# Patient Record
Sex: FEMALE | Race: ASIAN | URBAN - METROPOLITAN AREA
[De-identification: names, ages, dates, MRNs, and addresses within clinical notes are randomized per-mention and may not be internally consistent; named-entity substitution may affect disease eponyms.]

---

## 2017-06-05 ENCOUNTER — IMPORTED ENCOUNTER (OUTPATIENT)
Dept: URBAN - METROPOLITAN AREA CLINIC 50 | Facility: CLINIC | Age: 72
End: 2017-06-05

## 2017-06-14 NOTE — PATIENT DISCUSSION
Continue: PreserVision AREDS 2 (vit c,a-kv-byqmf-lutein-zeaxan): capsule: 357-223-60-1 mg-unit-mg-mg

## 2017-06-14 NOTE — PATIENT DISCUSSION
S/P RD REPAIR APPEARS STABLE: NO HOLES OR NO TEARS 360' WITH INDIRECT EXAM, OU. F&amp;F PRECAUTIONS OF F&amp;F REVIEWED WITH THE PATIENT. RETURN  AS SCHEDULED.

## 2017-06-14 NOTE — PATIENT DISCUSSION
S/P Retinal Repair Counseling: I have discussed the diagnosis of RD repair with the patient and the possibility of a another retinal tear or detachment. The signs/symptoms of a retinal tear/detachment were reviewed to include but not limited to redness, discharge, pain, increase or change in flashes of light, increase or change in floaters, decreased vision, part of the vision missing, veils or any other ocular concerns. I have further explained not all patients who develop a tear or detachment have notable symptoms, therefore, compliance with return visits are necessary. The patient was instructed to contact us immediately if they noticed any of the signs or symptoms of retinal detachment as noted above as the prognosis for any potential treatment options may be time related. Return for follow-up as scheduled.

## 2017-06-28 ENCOUNTER — IMPORTED ENCOUNTER (OUTPATIENT)
Dept: URBAN - METROPOLITAN AREA CLINIC 50 | Facility: CLINIC | Age: 72
End: 2017-06-28

## 2017-12-27 ENCOUNTER — IMPORTED ENCOUNTER (OUTPATIENT)
Dept: URBAN - METROPOLITAN AREA CLINIC 50 | Facility: CLINIC | Age: 72
End: 2017-12-27

## 2018-01-09 NOTE — PATIENT DISCUSSION
RETINAL PIGMENT EPITHELIAL DETACHMENT OD: NO EVIDENCE OF EXUDATION/HEMORRHAGE ON EXAM. RECOMMEND CLOSE OBSERVATION. RETURN AS SCHEDULED.

## 2018-01-09 NOTE — PATIENT DISCUSSION
RETINA IS ATTACHED OU: PVD OU; SCLERAL BUCKLE OD; NO HOLES OR TEARS SEEN ON DILATED EXAM TODAY.  RETINAL DETACHMENT SIGNS AND SYMPTOMS REVIEWED

## 2018-01-09 NOTE — PATIENT DISCUSSION
Continue: PreserVision AREDS 2 (vit c,e-sd-kecco-lutein-zeaxan): capsule: 735-432-24-7 mg-unit-mg-mg

## 2018-02-07 NOTE — PATIENT DISCUSSION
Continue: PreserVision AREDS 2 (vit c,g-lr-bglvb-lutein-zeaxan): capsule: 422-299-00-2 mg-unit-mg-mg

## 2018-02-07 NOTE — PATIENT DISCUSSION
RETINA IS ATTACHED OU: PVD OD; SCLERAL BUCKLE OD; NO HOLES OR TEARS SEEN ON DILATED EXAM TODAY.  RETINAL DETACHMENT SIGNS AND SYMPTOMS REVIEWED

## 2018-02-07 NOTE — PATIENT DISCUSSION
HTN RETINOPATHY: RETINAL COTTON WOOL SPOT OD, RESOLVING. EMPHASIZED IMPORTANCE OF GOOD BLOOD PRESSURE CONTROL TO MINIMIZE RISK OF VASCULAR COMPLICATIONS IN THE EYE.

## 2018-06-27 ENCOUNTER — IMPORTED ENCOUNTER (OUTPATIENT)
Dept: URBAN - METROPOLITAN AREA CLINIC 50 | Facility: CLINIC | Age: 73
End: 2018-06-27

## 2018-07-02 NOTE — PATIENT DISCUSSION
Continue: PreserVision AREDS 2 (vit c,z-ip-yefjj-lutein-zeaxan): capsule: 003-219-33-7 mg-unit-mg-mg 1 capsule once a day by mouth

## 2018-07-02 NOTE — PATIENT DISCUSSION
Continue: Refresh Tears (carboxymethylcellulose sodium): drops: 0.5% 1 drop as directed into both eyes

## 2018-08-08 NOTE — PATIENT DISCUSSION
HTN RETINOPATHY: IMPROVING; COTTON WOOL SPOTS HAVE RESOLVED; EMPHASIZED IMPORTANCE OF GOOD BLOOD PRESSURE CONTROL TO MINIMIZE RISK OF VASCULAR COMPLICATIONS IN THE EYE.

## 2018-08-08 NOTE — PATIENT DISCUSSION
Continue: PreserVision AREDS 2 (vit c,w-jp-pjpbx-lutein-zeaxan): capsule: 320-650-49-6 mg-unit-mg-mg 1 capsule once a day by mouth

## 2019-02-06 ENCOUNTER — IMPORTED ENCOUNTER (OUTPATIENT)
Dept: URBAN - METROPOLITAN AREA CLINIC 50 | Facility: CLINIC | Age: 74
End: 2019-02-06

## 2019-02-14 ENCOUNTER — IMPORTED ENCOUNTER (OUTPATIENT)
Dept: URBAN - METROPOLITAN AREA CLINIC 50 | Facility: CLINIC | Age: 74
End: 2019-02-14

## 2019-03-26 ENCOUNTER — IMPORTED ENCOUNTER (OUTPATIENT)
Dept: URBAN - METROPOLITAN AREA CLINIC 50 | Facility: CLINIC | Age: 74
End: 2019-03-26

## 2019-04-01 ENCOUNTER — IMPORTED ENCOUNTER (OUTPATIENT)
Dept: URBAN - METROPOLITAN AREA CLINIC 50 | Facility: CLINIC | Age: 74
End: 2019-04-01

## 2019-04-10 ENCOUNTER — IMPORTED ENCOUNTER (OUTPATIENT)
Dept: URBAN - METROPOLITAN AREA CLINIC 50 | Facility: CLINIC | Age: 74
End: 2019-04-10

## 2019-04-10 NOTE — PATIENT DISCUSSION
"""S/P IOL OD: Tecnis  ZKB00 19.5 (ORA) +ORA/Femto +Omidria. Continue post operative instructions and drops per schedule.  """

## 2019-04-19 ENCOUNTER — IMPORTED ENCOUNTER (OUTPATIENT)
Dept: URBAN - METROPOLITAN AREA CLINIC 50 | Facility: CLINIC | Age: 74
End: 2019-04-19

## 2019-04-24 ENCOUNTER — IMPORTED ENCOUNTER (OUTPATIENT)
Dept: URBAN - METROPOLITAN AREA CLINIC 50 | Facility: CLINIC | Age: 74
End: 2019-04-24

## 2019-04-24 PROBLEM — Z96.1: Noted: 2019-04-10

## 2019-04-24 PROBLEM — Z96.1: Noted: 2019-04-24

## 2019-04-24 NOTE — PATIENT DISCUSSION
"""S/P IOL OS: Tecnis  ZKB00 19.5 +Femto/Arcs +Omidria. Continue post operative instructions and drops per schedule.  """

## 2019-04-29 ENCOUNTER — IMPORTED ENCOUNTER (OUTPATIENT)
Dept: URBAN - METROPOLITAN AREA CLINIC 50 | Facility: CLINIC | Age: 74
End: 2019-04-29

## 2019-05-31 ENCOUNTER — IMPORTED ENCOUNTER (OUTPATIENT)
Dept: URBAN - METROPOLITAN AREA CLINIC 50 | Facility: CLINIC | Age: 74
End: 2019-05-31

## 2019-08-05 ENCOUNTER — IMPORTED ENCOUNTER (OUTPATIENT)
Dept: URBAN - METROPOLITAN AREA CLINIC 50 | Facility: CLINIC | Age: 74
End: 2019-08-05

## 2019-08-06 ENCOUNTER — IMPORTED ENCOUNTER (OUTPATIENT)
Dept: URBAN - METROPOLITAN AREA CLINIC 50 | Facility: CLINIC | Age: 74
End: 2019-08-06

## 2019-08-06 NOTE — PATIENT DISCUSSION
"""Start Cool compresses both eyes . "" ""Start lotemax SM both eyes four times a day ."" ""Continue Artificial tears both eyes two - four times a day . """

## 2019-08-12 ENCOUNTER — IMPORTED ENCOUNTER (OUTPATIENT)
Dept: URBAN - METROPOLITAN AREA CLINIC 50 | Facility: CLINIC | Age: 74
End: 2019-08-12

## 2019-08-29 ENCOUNTER — IMPORTED ENCOUNTER (OUTPATIENT)
Dept: URBAN - METROPOLITAN AREA CLINIC 50 | Facility: CLINIC | Age: 74
End: 2019-08-29

## 2019-09-12 ENCOUNTER — IMPORTED ENCOUNTER (OUTPATIENT)
Dept: URBAN - METROPOLITAN AREA CLINIC 50 | Facility: CLINIC | Age: 74
End: 2019-09-12

## 2020-05-18 NOTE — PATIENT DISCUSSION
Continue: PreserVision AREDS 2 (vit c,c-ae-bmdxt-lutein-zeaxan): capsule: 460-374-10-3 mg-unit-mg-mg 1 capsule once a day by mouth

## 2020-09-17 ENCOUNTER — IMPORTED ENCOUNTER (OUTPATIENT)
Dept: URBAN - METROPOLITAN AREA CLINIC 50 | Facility: CLINIC | Age: 75
End: 2020-09-17

## 2020-11-18 NOTE — PATIENT DISCUSSION
COUNSELING:
Continue: PreserVision AREDS 2 (vit c,r-zx-dqamh-lutein-zeaxan): capsule: 880-879-01-5 mg-unit-mg-mg 1 capsule once a day by mouth
Continue: Refresh Tears (carboxymethylcellulose sodium): drops: 0.5% 1 drop as directed into both eyes
General:
HTN RETINOPATHY: IMPROVING. EMPHASIZED IMPORTANCE OF GOOD BLOOD PRESSURE CONTROL TO MINIMIZE RISK OF VASCULAR COMPLICATIONS IN THE EYE.
Hypertensive Retinopathy Counseling: The pathophysiology of hypertensive retinopathy and associated comorbidity was discussed with the patient. The importance of compliance with medications, and good blood pressure control was emphasized to limit risk of retinal ischemia and loss of vision. The patient should return for follow up immediately if any change of vision.
Medications:
Posterior Vitreous Detachment: I have discussed the diagnosis of PVD with the patient and the possibility of a retinal tear or detachment. The signs/symptoms of a retinal tear/detachment were reviewed to include but not limited to redness, discharge, pain, increase or change in flashes of light, increase or change in floaters, decreased vision, part of the vision missing, veils or any other ocular concerns. I have further explained not all patients who develop a tear or detachment have notable symptoms, therefore, compliance with return visits are necessary. The patient was instructed to contact us immediately if they noticed any of the signs or symptoms of retinal detachment as noted above as the prognosis for any potential treatment options may be time related. Return for follow-up as scheduled.
RETINA IS ATTACHED OU: PVD OD; SCLERAL BUCKLE OD; NO HOLES OR TEARS SEEN ON DILATED EXAM TODAY.  RETINAL DETACHMENT SIGNS AND SYMPTOMS REVIEWED
REVIEWED PVD PRECAUTIONS WITH PATIENT AND ADVISED TO CALL IF EXPERIENCES NEW FLASHES OF LIGHT, INCREASE IN FLOATERS, OR DECREASE VISION IN EITHER EYE.
Patient/Caregiver provided printed discharge information.

## 2021-04-17 ASSESSMENT — VISUAL ACUITY
OS_BAT: 20/30
OS_OTHER: 20/40. 20/70.
OS_CC: 20/30
OS_BAT: 20/40
OD_CC: J3
OD_BAT: 20/400
OS_CC: 20/25+
OD_SC: 20/25-
OS_CC: J1+ (-)
OD_CC: 20/25
OS_SC: 20/40
OD_OTHER: 20/400.
OD_SC: 20/20
OD_SC: 20/20
OS_CC: J1+(-2)
OD_OTHER: 20/50. 20/70.
OS_SC: 20/20
OS_SC: 20/25+
OD_CC: J1+ (-)
OS_SC: 20/25-2
OS_BAT: 20/40
OD_OTHER: 20/30. 20/30.
OD_CC: J3
OD_CC: J1
OD_BAT: 20/50
OD_SC: 20/20-1
OS_SC: 20/25
OD_BAT: 20/400
OD_OTHER: 20/25. 20/70.
OS_OTHER: 20/30. 20/40.
OS_OTHER: 20/40. 20/50.
OS_BAT: 20/200
OD_SC: 20/25+1
OD_SC: 20/20-2
OD_SC: 20/25-2
OD_SC: 20/25-2
OS_SC: 20/25
OS_SC: 20/25
OS_CC: J1+-@ 18 IN
OD_SC: 20/25
OS_CC: J3
OD_CC: J1+(-2)
OD_PH: @ 17 IN
OS_SC: 20/25+
OS_SC: 20/20-3
OS_BAT: 20/200
OS_SC: 20/20-1
OD_SC: 20/30
OS_CC: 20/25
OS_SC: 20/25
OS_OTHER: 20/200.
OS_PH: 20/25
OS_OTHER: 20/200. 20/200.
OS_SC: 20/20
OS_CC: J3
OD_SC: 20/30-2
OD_OTHER: 20/400. 20/70.
OD_BAT: 20/30
OS_CC: J1
OD_BAT: 20/25
OD_SC: 20/20-2
OD_SC: 20/20-1
OD_CC: J1+-@ 18 IN

## 2021-04-17 ASSESSMENT — PACHYMETRY
OD_CT_UM: 566
OS_CT_UM: 565
OD_CT_UM: 566
OD_CT_UM: 566
OS_CT_UM: 565
OD_CT_UM: 566
OS_CT_UM: 565
OD_CT_UM: 566
OS_CT_UM: 565
OS_CT_UM: 565
OD_CT_UM: 566
OS_CT_UM: 565
OD_CT_UM: 566
OS_CT_UM: 565
OD_CT_UM: 566
OS_CT_UM: 565
OD_CT_UM: 566
OS_CT_UM: 565
OD_CT_UM: 566
OS_CT_UM: 565
OD_CT_UM: 566
OD_CT_UM: 566
OS_CT_UM: 565
OD_CT_UM: 566
OD_CT_UM: 566
OS_CT_UM: 565
OD_CT_UM: 566
OS_CT_UM: 565
OS_CT_UM: 565

## 2021-04-17 ASSESSMENT — TONOMETRY
OS_IOP_MMHG: 18
OD_IOP_MMHG: 18
OD_IOP_MMHG: 14
OS_IOP_MMHG: 13
OS_IOP_MMHG: 20
OD_IOP_MMHG: 12
OD_IOP_MMHG: 18
OS_IOP_MMHG: 27
OD_IOP_MMHG: 174
OD_IOP_MMHG: 16
OS_IOP_MMHG: 12
OS_IOP_MMHG: 14
OD_IOP_MMHG: 16
OD_IOP_MMHG: 17
OS_IOP_MMHG: 16
OS_IOP_MMHG: 13
OD_IOP_MMHG: 16
OS_IOP_MMHG: 17
OS_IOP_MMHG: 19
OS_IOP_MMHG: 20
OS_IOP_MMHG: 17
OS_IOP_MMHG: 16
OS_IOP_MMHG: 16
OD_IOP_MMHG: 18
OS_IOP_MMHG: 17
OD_IOP_MMHG: 15
OD_IOP_MMHG: 16
OD_IOP_MMHG: 19
OS_IOP_MMHG: 18
OS_IOP_MMHG: 15
OD_IOP_MMHG: 19
OS_IOP_MMHG: 15
OS_IOP_MMHG: 18
OD_IOP_MMHG: 13
OD_IOP_MMHG: 19
OD_IOP_MMHG: 17
OD_IOP_MMHG: 13
OS_IOP_MMHG: 14
OD_IOP_MMHG: 15
OD_IOP_MMHG: 15
OD_IOP_MMHG: 17
OS_IOP_MMHG: 19
OD_IOP_MMHG: 173
OS_IOP_MMHG: 16
OD_IOP_MMHG: 31
OD_IOP_MMHG: 14
OS_IOP_MMHG: 14
OS_IOP_MMHG: 15
OD_IOP_MMHG: 14
OS_IOP_MMHG: 28
OS_IOP_MMHG: 19
OD_IOP_MMHG: 30
OS_IOP_MMHG: 13
OD_IOP_MMHG: 13
OD_IOP_MMHG: 20
OS_IOP_MMHG: 17

## 2021-09-08 ENCOUNTER — PREPPED CHART (OUTPATIENT)
Dept: URBAN - METROPOLITAN AREA CLINIC 53 | Facility: CLINIC | Age: 76
End: 2021-09-08

## 2021-09-08 NOTE — PATIENT DISCUSSION
Continue: PreserVision AREDS 2 (vit c,m-rq-byqht-lutein-zeaxan): capsule: 664-400-47-1 mg-unit-mg-mg 1 capsule once a day by mouth

## 2021-09-20 ENCOUNTER — COMPREHENSIVE EXAM (OUTPATIENT)
Dept: URBAN - METROPOLITAN AREA CLINIC 53 | Facility: CLINIC | Age: 76
End: 2021-09-20

## 2021-09-20 DIAGNOSIS — E11.9: ICD-10-CM

## 2021-09-20 DIAGNOSIS — H35.3131: ICD-10-CM

## 2021-09-20 DIAGNOSIS — H40.013: ICD-10-CM

## 2021-09-20 DIAGNOSIS — H35.373: ICD-10-CM

## 2021-09-20 DIAGNOSIS — H26.493: ICD-10-CM

## 2021-09-20 DIAGNOSIS — H16.223: ICD-10-CM

## 2021-09-20 PROCEDURE — 92134 CPTRZ OPH DX IMG PST SGM RTA: CPT

## 2021-09-20 PROCEDURE — 92014 COMPRE OPH EXAM EST PT 1/>: CPT

## 2021-09-20 ASSESSMENT — VISUAL ACUITY
OU_SC: 20/20
OD_SC: 20/25
OS_GLARE: 20/30
OD_GLARE: 20/25
OD_GLARE: 20/30
OS_GLARE: 20/25
OS_SC: 20/25
OU_SC: J1

## 2021-09-20 ASSESSMENT — KERATOMETRY
OS_K2POWER_DIOPTERS: 42.25
OS_AXISANGLE_DEGREES: 58
OD_AXISANGLE2_DEGREES: 71
OD_K1POWER_DIOPTERS: 41.00
OS_K1POWER_DIOPTERS: 41.75
OS_AXISANGLE2_DEGREES: 148
OD_AXISANGLE_DEGREES: 161
OD_K2POWER_DIOPTERS: 41.75

## 2021-09-20 ASSESSMENT — TONOMETRY
OS_IOP_MMHG: 13
OD_IOP_MMHG: 13
OD_IOP_MMHG: 12
OS_IOP_MMHG: 12

## 2021-09-21 ENCOUNTER — DIAGNOSTICS - HVF/OCT (OUTPATIENT)
Dept: URBAN - METROPOLITAN AREA CLINIC 53 | Facility: CLINIC | Age: 76
End: 2021-09-21

## 2021-09-21 DIAGNOSIS — H40.013: ICD-10-CM

## 2021-09-21 PROCEDURE — 92133 CPTRZD OPH DX IMG PST SGM ON: CPT

## 2021-09-21 PROCEDURE — 92083 EXTENDED VISUAL FIELD XM: CPT

## 2021-09-21 ASSESSMENT — KERATOMETRY
OD_K1POWER_DIOPTERS: 41.00
OS_K2POWER_DIOPTERS: 42.25
OD_AXISANGLE2_DEGREES: 71
OD_AXISANGLE_DEGREES: 161
OS_AXISANGLE_DEGREES: 58
OS_AXISANGLE2_DEGREES: 148
OD_K2POWER_DIOPTERS: 41.75
OS_K1POWER_DIOPTERS: 41.75

## 2022-09-26 ENCOUNTER — COMPREHENSIVE EXAM (OUTPATIENT)
Dept: URBAN - METROPOLITAN AREA CLINIC 53 | Facility: CLINIC | Age: 77
End: 2022-09-26

## 2022-09-26 DIAGNOSIS — H26.493: ICD-10-CM

## 2022-09-26 DIAGNOSIS — H35.3131: ICD-10-CM

## 2022-09-26 DIAGNOSIS — E11.9: ICD-10-CM

## 2022-09-26 DIAGNOSIS — H16.223: ICD-10-CM

## 2022-09-26 PROCEDURE — 92014 COMPRE OPH EXAM EST PT 1/>: CPT

## 2022-09-26 PROCEDURE — 66821 AFTER CATARACT LASER SURGERY: CPT

## 2022-09-26 PROCEDURE — 92134 CPTRZ OPH DX IMG PST SGM RTA: CPT

## 2022-09-26 RX ORDER — PREDNISOLONE ACETATE 10 MG/ML: 1 SUSPENSION/ DROPS OPHTHALMIC TWICE A DAY

## 2022-09-26 ASSESSMENT — VISUAL ACUITY
OS_GLARE: 20/40
OD_GLARE: 20/80
OD_SC: 20/70
OD_PH: 20/30
OS_GLARE: 20/30
OD_GLARE: 20/100
OS_SC: 20/25
OU_SC: J2 @ 16 IN

## 2022-09-26 ASSESSMENT — KERATOMETRY
OS_K1POWER_DIOPTERS: 41.75
OS_AXISANGLE2_DEGREES: 148
OS_K2POWER_DIOPTERS: 42.25
OS_AXISANGLE_DEGREES: 58
OD_AXISANGLE2_DEGREES: 71
OD_AXISANGLE_DEGREES: 161
OD_K2POWER_DIOPTERS: 41.75
OD_K1POWER_DIOPTERS: 41.00

## 2022-09-26 ASSESSMENT — TONOMETRY
OD_IOP_MMHG: 11
OS_IOP_MMHG: 12
OS_IOP_MMHG: 11
OD_IOP_MMHG: 12

## 2022-09-26 NOTE — PROCEDURE NOTE: CLINICAL
PROCEDURE NOTE: YAG Capsulotomy OD. Diagnosis: Posterior Capsular Opacification (PCO). Prep: Mydriacil 1% and Phenylephrine 2.5%. Prior to treatment, the risks/benefits/alternatives were discussed. The patient wished to proceed with procedure. Consent was signed. Proparacaine and brominidine were placed into the operative eye after the eye was dilated. Power = 3.8mJ. Number of pulses = 77. Patient tolerated procedure well and there were no complications. Post Laser instructions given. Naty Morris

## 2022-09-26 NOTE — PATIENT DISCUSSION
PCO ([address] [place] [address]): Visually significant PCO present on exam today. Recommend YAG laser capsulotomy to improve vision and decrease glare symptoms. RBAs of procedure discussed. Patient agrees and wishes to proceed.

## 2022-10-03 ENCOUNTER — CLINIC PROCEDURE ONLY (OUTPATIENT)
Dept: URBAN - METROPOLITAN AREA CLINIC 53 | Facility: CLINIC | Age: 77
End: 2022-10-03

## 2022-10-03 DIAGNOSIS — H26.492: ICD-10-CM

## 2022-10-03 PROCEDURE — 66821 AFTER CATARACT LASER SURGERY: CPT

## 2022-10-03 ASSESSMENT — TONOMETRY
OS_IOP_MMHG: 16
OD_IOP_MMHG: 16
OD_IOP_MMHG: 15
OS_IOP_MMHG: 15

## 2022-10-03 ASSESSMENT — VISUAL ACUITY
OD_SC: 20/20
OS_SC: 20/25-2

## 2022-10-03 ASSESSMENT — KERATOMETRY
OS_K1POWER_DIOPTERS: 41.75
OD_K1POWER_DIOPTERS: 41.00
OS_AXISANGLE2_DEGREES: 148
OS_K2POWER_DIOPTERS: 42.25
OD_AXISANGLE_DEGREES: 161
OD_AXISANGLE2_DEGREES: 71
OD_K2POWER_DIOPTERS: 41.75
OS_AXISANGLE_DEGREES: 58

## 2022-10-03 NOTE — PATIENT DISCUSSION
PCO ([id number] [place] [address]): Visually significant PCO present on exam today. Recommend YAG laser capsulotomy to improve vision and decrease glare symptoms. RBAs of procedure discussed. Patient agrees and wishes to proceed.

## 2022-10-03 NOTE — PROCEDURE NOTE: CLINICAL
PROCEDURE NOTE: YAG Capsulotomy OS. Diagnosis: Posterior Capsular Opacification (PCO). Prep: Mydriacil 1% and Phenylephrine 2.5%. Prior to treatment, the risks/benefits/alternatives were discussed. The patient wished to proceed with procedure. Consent was signed. Proparacaine and brominidine were placed into the operative eye after the eye was dilated. Power = 3.8mJ. Number of pulses = 45. Patient tolerated procedure well and there were no complications. Post Laser instructions given. Lisandra Beatty

## 2022-10-31 ENCOUNTER — POST-OP (OUTPATIENT)
Dept: URBAN - METROPOLITAN AREA CLINIC 53 | Facility: CLINIC | Age: 77
End: 2022-10-31

## 2022-10-31 DIAGNOSIS — Z96.1: ICD-10-CM

## 2022-10-31 DIAGNOSIS — H16.223: ICD-10-CM

## 2022-10-31 DIAGNOSIS — E11.9: ICD-10-CM

## 2022-10-31 DIAGNOSIS — H40.013: ICD-10-CM

## 2022-10-31 DIAGNOSIS — H35.3131: ICD-10-CM

## 2022-10-31 DIAGNOSIS — Z98.890: ICD-10-CM

## 2022-10-31 ASSESSMENT — KERATOMETRY
OD_K1POWER_DIOPTERS: 41.00
OS_AXISANGLE2_DEGREES: 148
OD_AXISANGLE_DEGREES: 161
OS_K2POWER_DIOPTERS: 42.25
OD_K2POWER_DIOPTERS: 41.75
OS_AXISANGLE_DEGREES: 58
OS_K1POWER_DIOPTERS: 41.75
OD_AXISANGLE2_DEGREES: 71

## 2022-10-31 ASSESSMENT — TONOMETRY
OD_IOP_MMHG: 16
OD_IOP_MMHG: 17
OS_IOP_MMHG: 17
OS_IOP_MMHG: 16

## 2022-10-31 ASSESSMENT — VISUAL ACUITY
OD_SC: 20/20-1
OS_SC: 20/25+1

## 2022-10-31 NOTE — PATIENT DISCUSSION
Patient bothered by excessive tearing, recommended patient to use PF Artificial Tears 2-3 times a day in both eyes. Patient also advised to take breaks every 20 minutes to blink and keep eyes moisturized.

## 2022-10-31 NOTE — PATIENT DISCUSSION
PCO ([address] [place] [address]): Visually significant PCO present on exam today. Recommend YAG laser capsulotomy to improve vision and decrease glare symptoms. RBAs of procedure discussed. Patient agrees and wishes to proceed.

## 2023-04-05 ENCOUNTER — FOLLOW UP (OUTPATIENT)
Dept: URBAN - METROPOLITAN AREA CLINIC 53 | Facility: CLINIC | Age: 78
End: 2023-04-05

## 2023-04-05 DIAGNOSIS — H40.013: ICD-10-CM

## 2023-04-05 PROCEDURE — 92012 INTRM OPH EXAM EST PATIENT: CPT

## 2023-04-05 PROCEDURE — 92083 EXTENDED VISUAL FIELD XM: CPT

## 2023-04-05 PROCEDURE — 92133 CPTRZD OPH DX IMG PST SGM ON: CPT

## 2023-04-05 ASSESSMENT — KERATOMETRY
OS_K2POWER_DIOPTERS: 42.25
OS_AXISANGLE_DEGREES: 58
OD_AXISANGLE2_DEGREES: 71
OS_K1POWER_DIOPTERS: 41.75
OS_AXISANGLE2_DEGREES: 148
OD_AXISANGLE_DEGREES: 161
OD_K2POWER_DIOPTERS: 41.75
OD_K1POWER_DIOPTERS: 41.00

## 2023-04-05 ASSESSMENT — VISUAL ACUITY
OS_SC: 20/30
OD_SC: 20/20
OS_PH: 20/20-1

## 2023-04-05 ASSESSMENT — TONOMETRY
OD_IOP_MMHG: 19
OS_IOP_MMHG: 19
OS_IOP_MMHG: 18
OD_IOP_MMHG: 18

## 2023-11-20 ENCOUNTER — COMPREHENSIVE EXAM (OUTPATIENT)
Dept: URBAN - METROPOLITAN AREA CLINIC 53 | Facility: CLINIC | Age: 78
End: 2023-11-20

## 2023-11-20 DIAGNOSIS — E11.9: ICD-10-CM

## 2023-11-20 DIAGNOSIS — H40.013: ICD-10-CM

## 2023-11-20 DIAGNOSIS — H35.3131: ICD-10-CM

## 2023-11-20 PROCEDURE — 92014 COMPRE OPH EXAM EST PT 1/>: CPT

## 2023-11-20 PROCEDURE — 92134 CPTRZ OPH DX IMG PST SGM RTA: CPT

## 2023-11-20 ASSESSMENT — KERATOMETRY
OS_K2POWER_DIOPTERS: 42.00
OS_K2POWER_DIOPTERS: 42.25
OD_AXISANGLE_DEGREES: 002
OD_AXISANGLE2_DEGREES: 92
OS_AXISANGLE2_DEGREES: 148
OS_K1POWER_DIOPTERS: 41.75
OD_AXISANGLE2_DEGREES: 71
OS_AXISANGLE_DEGREES: 067
OD_AXISANGLE_DEGREES: 161
OD_K1POWER_DIOPTERS: 41.00
OS_AXISANGLE_DEGREES: 58
OD_K1POWER_DIOPTERS: 41.50
OS_AXISANGLE2_DEGREES: 157
OD_K2POWER_DIOPTERS: 41.75

## 2023-11-20 ASSESSMENT — VISUAL ACUITY
OU_SC: J1@16"
OS_SC: 20/25+2
OD_SC: 20/20-2

## 2023-11-20 ASSESSMENT — TONOMETRY
OD_IOP_MMHG: 18
OS_IOP_MMHG: 18
OS_IOP_MMHG: 19
OD_IOP_MMHG: 19

## 2025-03-12 ENCOUNTER — COMPREHENSIVE EXAM (OUTPATIENT)
Age: 80
End: 2025-03-12

## 2025-03-12 DIAGNOSIS — H35.3131: ICD-10-CM

## 2025-03-12 DIAGNOSIS — E11.9: ICD-10-CM

## 2025-03-12 DIAGNOSIS — D23.112: ICD-10-CM

## 2025-03-12 DIAGNOSIS — H40.013: ICD-10-CM

## 2025-03-12 PROCEDURE — 99214 OFFICE O/P EST MOD 30 MIN: CPT

## 2025-03-12 PROCEDURE — 92134 CPTRZ OPH DX IMG PST SGM RTA: CPT
